# Patient Record
Sex: MALE | Race: WHITE | NOT HISPANIC OR LATINO | Employment: OTHER | URBAN - METROPOLITAN AREA
[De-identification: names, ages, dates, MRNs, and addresses within clinical notes are randomized per-mention and may not be internally consistent; named-entity substitution may affect disease eponyms.]

---

## 2024-07-17 ENCOUNTER — CONSULT (OUTPATIENT)
Dept: ONCOLOGY | Facility: HOSPITAL | Age: 24
End: 2024-07-17
Payer: OTHER GOVERNMENT

## 2024-07-17 ENCOUNTER — LAB (OUTPATIENT)
Dept: ONCOLOGY | Facility: HOSPITAL | Age: 24
End: 2024-07-17
Payer: OTHER GOVERNMENT

## 2024-07-17 VITALS
BODY MASS INDEX: 28.55 KG/M2 | OXYGEN SATURATION: 99 % | WEIGHT: 215.4 LBS | RESPIRATION RATE: 16 BRPM | HEIGHT: 73 IN | HEART RATE: 68 BPM | DIASTOLIC BLOOD PRESSURE: 84 MMHG | SYSTOLIC BLOOD PRESSURE: 104 MMHG | TEMPERATURE: 98.4 F

## 2024-07-17 DIAGNOSIS — E61.1 IRON DEFICIENCY: ICD-10-CM

## 2024-07-17 DIAGNOSIS — D64.9 LOW HEMATOCRIT: Primary | ICD-10-CM

## 2024-07-17 DIAGNOSIS — D64.9 LOW HEMATOCRIT: ICD-10-CM

## 2024-07-17 LAB
ALBUMIN SERPL-MCNC: 4.9 G/DL (ref 3.5–5.2)
ALBUMIN/GLOB SERPL: 2 G/DL
ALP SERPL-CCNC: 72 U/L (ref 39–117)
ALT SERPL W P-5'-P-CCNC: 11 U/L (ref 1–41)
ANION GAP SERPL CALCULATED.3IONS-SCNC: 9.5 MMOL/L (ref 5–15)
AST SERPL-CCNC: 13 U/L (ref 1–40)
BASOPHILS # BLD AUTO: 0.03 10*3/MM3 (ref 0–0.2)
BASOPHILS NFR BLD AUTO: 0.4 % (ref 0–1.5)
BILIRUB SERPL-MCNC: 0.5 MG/DL (ref 0–1.2)
BUN SERPL-MCNC: 15 MG/DL (ref 6–20)
BUN/CREAT SERPL: 14.9 (ref 7–25)
CALCIUM SPEC-SCNC: 9.7 MG/DL (ref 8.6–10.5)
CHLORIDE SERPL-SCNC: 102 MMOL/L (ref 98–107)
CO2 SERPL-SCNC: 27.5 MMOL/L (ref 22–29)
CREAT SERPL-MCNC: 1.01 MG/DL (ref 0.76–1.27)
DEPRECATED RDW RBC AUTO: 38.5 FL (ref 37–54)
EGFRCR SERPLBLD CKD-EPI 2021: 105.8 ML/MIN/1.73
EOSINOPHIL # BLD AUTO: 0.11 10*3/MM3 (ref 0–0.4)
EOSINOPHIL NFR BLD AUTO: 1.3 % (ref 0.3–6.2)
ERYTHROCYTE [DISTWIDTH] IN BLOOD BY AUTOMATED COUNT: 12 % (ref 12.3–15.4)
FERRITIN SERPL-MCNC: 309.3 NG/ML (ref 30–400)
GLOBULIN UR ELPH-MCNC: 2.5 GM/DL
GLUCOSE SERPL-MCNC: 89 MG/DL (ref 65–99)
HCT VFR BLD AUTO: 42.7 % (ref 37.5–51)
HGB BLD-MCNC: 14.8 G/DL (ref 13–17.7)
IMM GRANULOCYTES # BLD AUTO: 0.02 10*3/MM3 (ref 0–0.05)
IMM GRANULOCYTES NFR BLD AUTO: 0.2 % (ref 0–0.5)
IRON 24H UR-MRATE: 108 MCG/DL (ref 59–158)
IRON SATN MFR SERPL: 27 % (ref 20–50)
LYMPHOCYTES # BLD AUTO: 3.21 10*3/MM3 (ref 0.7–3.1)
LYMPHOCYTES NFR BLD AUTO: 37.8 % (ref 19.6–45.3)
MCH RBC QN AUTO: 30.6 PG (ref 26.6–33)
MCHC RBC AUTO-ENTMCNC: 34.7 G/DL (ref 31.5–35.7)
MCV RBC AUTO: 88.2 FL (ref 79–97)
MONOCYTES # BLD AUTO: 0.72 10*3/MM3 (ref 0.1–0.9)
MONOCYTES NFR BLD AUTO: 8.5 % (ref 5–12)
NEUTROPHILS NFR BLD AUTO: 4.4 10*3/MM3 (ref 1.7–7)
NEUTROPHILS NFR BLD AUTO: 51.8 % (ref 42.7–76)
NRBC BLD AUTO-RTO: 0 /100 WBC (ref 0–0.2)
PLATELET # BLD AUTO: 185 10*3/MM3 (ref 140–450)
PMV BLD AUTO: 11.8 FL (ref 6–12)
POTASSIUM SERPL-SCNC: 4.2 MMOL/L (ref 3.5–5.2)
PROT SERPL-MCNC: 7.4 G/DL (ref 6–8.5)
RBC # BLD AUTO: 4.84 10*6/MM3 (ref 4.14–5.8)
RETICS # AUTO: 0.06 10*6/MM3 (ref 0.02–0.13)
RETICS/RBC NFR AUTO: 1.19 % (ref 0.7–1.9)
SODIUM SERPL-SCNC: 139 MMOL/L (ref 136–145)
TIBC SERPL-MCNC: 399 MCG/DL (ref 298–536)
TRANSFERRIN SERPL-MCNC: 268 MG/DL (ref 200–360)
WBC NRBC COR # BLD AUTO: 8.49 10*3/MM3 (ref 3.4–10.8)

## 2024-07-17 PROCEDURE — 82607 VITAMIN B-12: CPT

## 2024-07-17 PROCEDURE — 83540 ASSAY OF IRON: CPT

## 2024-07-17 PROCEDURE — 80053 COMPREHEN METABOLIC PANEL: CPT

## 2024-07-17 PROCEDURE — 82728 ASSAY OF FERRITIN: CPT

## 2024-07-17 PROCEDURE — 84466 ASSAY OF TRANSFERRIN: CPT

## 2024-07-17 PROCEDURE — 82746 ASSAY OF FOLIC ACID SERUM: CPT

## 2024-07-17 PROCEDURE — 36415 COLL VENOUS BLD VENIPUNCTURE: CPT

## 2024-07-17 PROCEDURE — 85045 AUTOMATED RETICULOCYTE COUNT: CPT

## 2024-07-17 PROCEDURE — 85025 COMPLETE CBC W/AUTO DIFF WBC: CPT

## 2024-07-17 RX ORDER — FERROUS GLUCONATE 324(38)MG
324 TABLET ORAL
Qty: 30 TABLET | Refills: 5 | Status: SHIPPED | OUTPATIENT
Start: 2024-07-17 | End: 2024-07-19 | Stop reason: SDUPTHER

## 2024-07-17 NOTE — PROGRESS NOTES
"Chief Complaint/Care Team   Anemia    Jimmy Garcia APRN Rice, Igor Reyes MD    History of Present Illness     Diagnosis: Iron deficiency anemia    Current Treatment: Ferrous gluconate 324 mg p.o. once daily, prescribed 7/17/2024    Previous Treatment: None    Jose Antonio Matthew is a 24 y.o. male who presents to [facility] HEMATOLOGY & ONCOLOGY for evaluation of iron deficiency anemia.    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  Labs from 6 5/24/2024 revealed white blood cell count 6.4, hemoglobin 14.2, hematocrit 39.5, MCV 85.3, platelets 168 K, urinalysis was negative for blood and negative for UTI.  Iron studies from 6/27/2024 revealed iron level 53, TIBC 331 iron saturation 16%, transferrin 265, ferritin level 293, TSH 4.1.    Patient denies any hematuria, hemoptysis, hematochezia, melena or other blood loss.  He does report having injury to his forehead requiring stitches but reported very little blood loss with this injury.    Patient denies any family history of any blood conditions, blood cancers, or other cancers.    Patient denies any significant medical history.  Patient states he does not smoking cigarettes, reports intake of alcohol approximately 3 beers on the weekend.    History of Present Illness         Review of Systems     Oncology/Hematology History    No history exists.       Objective     Vitals:    07/17/24 1324   BP: 104/84   Pulse: 68   Resp: 16   Temp: 98.4 °F (36.9 °C)   TempSrc: Temporal   SpO2: 99%   Weight: 97.7 kg (215 lb 6.4 oz)   Height: 185.4 cm (73\")   PainSc: 0-No pain     ECOG score: 0         PHQ-9 Total Score:         Physical Exam  Vitals reviewed. Exam conducted with a chaperone present.   Constitutional:       General: He is not in acute distress.  HENT:      Head: Normocephalic and atraumatic.   Eyes:      Extraocular Movements: Extraocular movements intact.      " "Conjunctiva/sclera: Conjunctivae normal.   Pulmonary:      Effort: Pulmonary effort is normal.   Neurological:      Mental Status: He is alert and oriented to person, place, and time.         Physical Exam        Past Medical History     Past Medical History:   Diagnosis Date    Anemia      No current outpatient medications on file prior to visit.     No current facility-administered medications on file prior to visit.      Allergies   Allergen Reactions    Sulfa Antibiotics Hives     Past Surgical History:   Procedure Laterality Date    WISDOM TOOTH EXTRACTION       Social History     Socioeconomic History    Marital status:    Tobacco Use    Smoking status: Never    Smokeless tobacco: Current     Types: Chew   Vaping Use    Vaping status: Never Used   Substance and Sexual Activity    Alcohol use: Yes     Comment: occ.    Drug use: Never    Sexual activity: Defer     History reviewed. No pertinent family history.    Results     Result Review   The following data was reviewed by: Katie Davis MD on 07/17/2024:  No results found for: \"HGB\", \"HCT\", \"MCV\", \"PLT\", \"WBC\", \"NEUTROABS\", \"LYMPHSABS\", \"MONOSABS\", \"EOSABS\", \"BASOSABS\"  No results found for: \"GLUCOSE\", \"BUN\", \"CREATININE\", \"NA\", \"K\", \"CL\", \"CO2\", \"CALCIUM\", \"PROTEINTOT\", \"ALBUMIN\", \"BILITOT\", \"ALKPHOS\", \"AST\", \"ALT\"  No results found for: \"MG\", \"PHOS\", \"FREET4\", \"TSH\"    No radiology results for the last day       Assessment & Plan     Diagnoses and all orders for this visit:    1. Low hematocrit (Primary)  -     CBC & Differential; Future  -     Comprehensive Metabolic Panel; Future  -     Ferritin; Future  -     Iron Profile; Future  -     Vitamin B12; Future  -     Folate; Future  -     Reticulocytes; Future  -     Occult Blood, Fecal By Immunoassay - Stool, Per Rectum; Future  -     ferrous gluconate (FERGON) 324 MG tablet; Take 1 tablet by mouth Daily With Breakfast.  Dispense: 30 tablet; Refill: 5    2. Iron deficiency  -     CBC & Differential; " Future  -     Comprehensive Metabolic Panel; Future  -     Ferritin; Future  -     Iron Profile; Future  -     Vitamin B12; Future  -     Folate; Future  -     Reticulocytes; Future  -     Occult Blood, Fecal By Immunoassay - Stool, Per Rectum; Future  -     ferrous gluconate (FERGON) 324 MG tablet; Take 1 tablet by mouth Daily With Breakfast.  Dispense: 30 tablet; Refill: 5         Jose Antonio Matthew is a 24 y.o. male who presents to [facility] HEMATOLOGY [facility] ONCOLOGY for evaluation iron deficiency anemia discovered on routine lab work for his employment.    Iron deficiency anemia  -unclear etiology, UA negative, pt denies any other blood loss or melena  -shared plan to obtain FOBT to assess for GI blood loss  -plan to obtain the following labs today to further evaluate anemia: CBC, CMP, iron profile, ferritin, B12, folate, reticulocyte count  -In the meantime recommend patient begin ferrous gluconate 324 mg p.o. once daily, counseled it can cause constipation, recommend patient start over-the-counter stool softener.    Plan for patient follow-up in 6 weeks to discuss results of labs.    Please note that portions of this note were completed with a voice recognition program.    Electronically signed by Katie Davis MD, 07/17/24, 3:55 PM EDT.    Assessment & Plan      Follow Up     I spent 45 minutes caring for Jose Antonio on this date of service. This time includes time spent by me in the following activities:preparing for the visit, reviewing tests, obtaining and/or reviewing a separately obtained history, performing a medically appropriate examination and/or evaluation , counseling and educating the patient/family/caregiver, ordering medications, tests, or procedures, referring and communicating with other health care professionals , documenting information in the medical record, independently interpreting results and communicating that information with the patient/family/caregiver, and care  coordination    Any chemotherapy or immunotherapy or other systemic therapy treatment plan involves a high risk of complications and/or mortality of patient management.    The patient was seen and examined. Work by the provider also included review and/or ordering of lab tests, review and/or ordering of radiology tests, review and/or ordering of medicine tests, discussion with other physicians or providers, independent review of data, obtaining old records, review/summation of old records, and/or other review.    I have reviewed the family history, social history, and past medical history for this patient. Previous information and data has been reviewed and updated as needed. I have reviewed and verified the chief complaint, history, and other documentation. The patient was interviewed and examined in the clinic and the chart reviewed. The previous observations, recommendations, and conclusions were reviewed including those of other providers.     The plan was discussed with the patient and/or family. The patient was given time to ask questions and these questions were answered. At the conclusion of their visit they had no additional questions or concerns and all questions were answered to their satisfaction.    Patient was given instructions and counseling regarding his condition or for health maintenance advice. Please see specific information pulled into the AVS if appropriate.       Patient or patient representative verbalized consent for the use of Ambient Listening during the visit with  Katie Davis MD for chart documentation. 7/17/2024  15:55 EDT

## 2024-07-17 NOTE — PROGRESS NOTES
"Chief Complaint/Care Team   Anemia    Jimmy Garcia APRN Rice, Igor Reyes MD    History of Present Illness     Diagnosis: Iron Deficiency Anemia, unclear etiology    Current Treatment: Ferrous gluconate 324mg PO QD  Previous Treatment: None    Jose Antonio Matthew is a 24 y.o. male who presents to [facility] HEMATOLOGY & ONCOLOGY for iron deficiency anemia.    History of Present Illness         Review of Systems     Oncology/Hematology History    No history exists.       Objective     Vitals:    07/17/24 1324   BP: 104/84   Pulse: 68   Resp: 16   Temp: 98.4 °F (36.9 °C)   TempSrc: Temporal   SpO2: 99%   Weight: 97.7 kg (215 lb 6.4 oz)   Height: 185.4 cm (73\")   PainSc: 0-No pain     ECOG score: 0         PHQ-9 Total Score:         Physical Exam  Vitals reviewed. Exam conducted with a chaperone present.   Constitutional:       General: He is not in acute distress.  HENT:      Head: Normocephalic and atraumatic.   Eyes:      Extraocular Movements: Extraocular movements intact.      Conjunctiva/sclera: Conjunctivae normal.   Pulmonary:      Effort: Pulmonary effort is normal.   Neurological:      Mental Status: He is alert and oriented to person, place, and time.         Physical Exam        Past Medical History     Past Medical History:   Diagnosis Date    Anemia      No current outpatient medications on file prior to visit.     No current facility-administered medications on file prior to visit.      Allergies   Allergen Reactions    Sulfa Antibiotics Hives     Past Surgical History:   Procedure Laterality Date    WISDOM TOOTH EXTRACTION       Social History     Socioeconomic History    Marital status:    Tobacco Use    Smoking status: Never    Smokeless tobacco: Current     Types: Chew   Vaping Use    Vaping status: Never Used   Substance and Sexual Activity    Alcohol use: Yes     Comment: occ.    Drug use: Never    Sexual activity: Defer     History reviewed. No pertinent family " "history.    Results     Result Review   The following data was reviewed by: Katie Davis MD on 07/17/2024:  No results found for: \"HGB\", \"HCT\", \"MCV\", \"PLT\", \"WBC\", \"NEUTROABS\", \"LYMPHSABS\", \"MONOSABS\", \"EOSABS\", \"BASOSABS\"  No results found for: \"GLUCOSE\", \"BUN\", \"CREATININE\", \"NA\", \"K\", \"CL\", \"CO2\", \"CALCIUM\", \"PROTEINTOT\", \"ALBUMIN\", \"BILITOT\", \"ALKPHOS\", \"AST\", \"ALT\"  No results found for: \"MG\", \"PHOS\", \"FREET4\", \"TSH\"    No radiology results for the last day       Assessment & Plan     Diagnoses and all orders for this visit:    1. Low hematocrit (Primary)  -     CBC & Differential; Future  -     Comprehensive Metabolic Panel; Future  -     Ferritin; Future  -     Iron Profile; Future  -     Vitamin B12; Future  -     Folate; Future  -     Reticulocytes; Future  -     Occult Blood, Fecal By Immunoassay - Stool, Per Rectum; Future  -     ferrous gluconate (FERGON) 324 MG tablet; Take 1 tablet by mouth Daily With Breakfast.  Dispense: 30 tablet; Refill: 5    2. Iron deficiency  -     CBC & Differential; Future  -     Comprehensive Metabolic Panel; Future  -     Ferritin; Future  -     Iron Profile; Future  -     Vitamin B12; Future  -     Folate; Future  -     Reticulocytes; Future  -     Occult Blood, Fecal By Immunoassay - Stool, Per Rectum; Future  -     ferrous gluconate (FERGON) 324 MG tablet; Take 1 tablet by mouth Daily With Breakfast.  Dispense: 30 tablet; Refill: 5         Jose Antonio Matthew is a 24 y.o. male who presents to [facility]EMAT[facility] ONCOLOGY for anemia    -need labs CBC, CMP, Iron profile, Ferritin, B12, folate, TSH      Assessment & Plan      Follow Up     {Time Spent-Use for E/M Coding (Optional):24805}    Any chemotherapy or immunotherapy or other systemic therapy treatment plan involves a high risk of complications and/or mortality of patient management.    The patient was seen and examined. Work by the provider also included review and/or ordering of lab tests, review " and/or ordering of radiology tests, review and/or ordering of medicine tests, discussion with other physicians or providers, independent review of data, obtaining old records, review/summation of old records, and/or other review.    I have reviewed the family history, social history, and past medical history for this patient. Previous information and data has been reviewed and updated as needed. I have reviewed and verified the chief complaint, history, and other documentation. The patient was interviewed and examined in the clinic and the chart reviewed. The previous observations, recommendations, and conclusions were reviewed including those of other providers.     The plan was discussed with the patient and/or family. The patient was given time to ask questions and these questions were answered. At the conclusion of their visit they had no additional questions or concerns and all questions were answered to their satisfaction.    Patient was given instructions and counseling regarding his condition or for health maintenance advice. Please see specific information pulled into the AVS if appropriate.       {IRLANDA [employer] Provider Statement:67835}

## 2024-07-18 LAB
FOLATE SERPL-MCNC: 15.1 NG/ML (ref 4.78–24.2)
VIT B12 BLD-MCNC: 476 PG/ML (ref 211–946)

## 2024-07-19 ENCOUNTER — TELEPHONE (OUTPATIENT)
Dept: ONCOLOGY | Facility: HOSPITAL | Age: 24
End: 2024-07-19
Payer: OTHER GOVERNMENT

## 2024-07-19 DIAGNOSIS — E61.1 IRON DEFICIENCY: ICD-10-CM

## 2024-07-19 DIAGNOSIS — D64.9 LOW HEMATOCRIT: ICD-10-CM

## 2024-07-19 RX ORDER — FERROUS GLUCONATE 324(38)MG
324 TABLET ORAL
Qty: 30 TABLET | Refills: 5 | Status: SHIPPED | OUTPATIENT
Start: 2024-07-19

## 2024-07-19 NOTE — TELEPHONE ENCOUNTER
SPOKE WITH PATIENT AND HE STATES HE WILL BE OUT OF TOWN FROM 7/26-9/2. DR. FLYNN WILL ALSO BE OUT OF THE OFFICE THE FIRST WEEK OF SEPTEMBER. THAT BEING SAID, THE SOONEST THAT PATIENT CAN COME IN FOR HIS 6 WEEK FOLLOW UP IS 9/10/24 AT 8:45 AM. DR. FLYNN'S NURSE, ROSA WAS MADE AWARE OF THIS SITUATION.

## 2024-07-22 ENCOUNTER — TELEPHONE (OUTPATIENT)
Dept: ONCOLOGY | Facility: HOSPITAL | Age: 24
End: 2024-07-22
Payer: OTHER GOVERNMENT

## 2024-07-22 NOTE — TELEPHONE ENCOUNTER
Left message for patient, advised that CBC showed normal WBC, hemoglobin and plts, but does show a mild decrease in RDW (related to the size of red cells), also a mild increase in lymphocyte counts, but was otherwise unremarkable. Advised that his B12, folate, reticulocyte count and iron studies all showed as normal. 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 Instructed patient to take oral iron pill that was sent to [facility] pharmacy and to ensure that he completes the fecal specimen to complete the evaluation. Instructed to maintain all follow up visits and to contact the office with any questions or concerns.

## 2024-07-22 NOTE — TELEPHONE ENCOUNTER
----- Message from Katie Davis sent at 7/18/2024  9:15 AM EDT -----  Regarding: RE: labs  Xavier, please let pt know that CBC showed normal WBC, hemoglobin and plts, but it did show a mild decrease in RDW (an index that looks at the cell size of red cells), also a mild increase in lymphocyte counts, otherwise was unremarkable, pt with normal B12, folate, reticulocyte count and iron studies. I would recommend he take oral iron pill until we have his FOBT back and it is negative. Again, I am happy to discuss case with his flight doctor as well. Thanks.  ----- Message -----  From: Katie Davis MD  Sent: 7/17/2024   3:55 PM EDT  To: Katie Davis MD  Subject: labs                                             Follow up on labs from 7/17/2024

## 2024-09-03 ENCOUNTER — TELEPHONE (OUTPATIENT)
Dept: ONCOLOGY | Facility: HOSPITAL | Age: 24
End: 2024-09-03

## 2024-09-03 NOTE — TELEPHONE ENCOUNTER
Caller: Jose Antonio Matthew    Relationship to patient: Self    Best call back number: [phone]     Chief complaint: R/S    Type of visit: FOLLOW UP    Requested date: FIRST WEEK OF OCT, OR AFTER.  PLEASE SEND PT MESSAGE THROUGH [employer] TO R/S.  HUB UNABLE TO SCHEDULE.     If rescheduling, when is the original appointment: 9/10